# Patient Record
Sex: FEMALE | Race: WHITE | NOT HISPANIC OR LATINO | URBAN - NONMETROPOLITAN AREA
[De-identification: names, ages, dates, MRNs, and addresses within clinical notes are randomized per-mention and may not be internally consistent; named-entity substitution may affect disease eponyms.]

---

## 2019-06-07 ENCOUNTER — IMPORTED ENCOUNTER (OUTPATIENT)
Dept: URBAN - NONMETROPOLITAN AREA CLINIC 1 | Facility: CLINIC | Age: 69
End: 2019-06-07

## 2019-06-07 PROBLEM — E11.3293: Noted: 2019-06-07

## 2019-06-07 PROBLEM — H25.813: Noted: 2019-06-07

## 2019-06-07 PROCEDURE — 92004 COMPRE OPH EXAM NEW PT 1/>: CPT

## 2019-06-07 NOTE — PATIENT DISCUSSION
Cataract OU-Visually significant cataract OU.-Cataract(s) causing symptomatic impairment of visual function not correctable with a tolerable change in glasses or contact lenses lighting or non-operative means resulting in specific activity limitations and/or participation restrictions including but not limited to reading viewing television driving or meeting vocational or recreational needs. -Expectation is clearer vision and functional improvement in symptoms as well as reduced glare disability after cataract removal.-Order IOLMaster and OPD today. -Recommend Standard/Traditional OU based on today's OPD testing and lifestyle questionnaire.-All questions were answered regarding surgery including pre and post-op medications appointments activity restrictions and anesthetic usage.-The risks benefits and alternatives and special risk factors for the patient were discussed in detail including but not limited to: bleeding infection retinal detachment vitreous loss problems with the implant and possible need for additional surgery.-Although rare the possibility of complete vision loss was discussed.-The possible need for glasses post-operatively was discussed.-Order medical clearance exam based on history of hypertension and headaches-Patient elects to proceed with cataract surgery OS. Will schedule at patient's convenience and re-evaluate OD in the future. Mild NPDR OU s Edema OU-Stressed the importance of keeping blood sugars under control blood pressure under control and weight normalization and regular visits with PCP. -Explained the possible effects of poorly controlled diabetes and the damage that diabetes can cause to ocular health. -Patient to check HgbA1C.-Pt instructed to contact our office with any vision changes.

## 2019-06-11 ENCOUNTER — IMPORTED ENCOUNTER (OUTPATIENT)
Dept: URBAN - NONMETROPOLITAN AREA CLINIC 1 | Facility: CLINIC | Age: 69
End: 2019-06-11

## 2019-06-11 PROBLEM — E11.3293: Noted: 2019-06-11

## 2019-06-11 PROBLEM — H25.813: Noted: 2019-06-11

## 2019-06-25 ENCOUNTER — IMPORTED ENCOUNTER (OUTPATIENT)
Dept: URBAN - NONMETROPOLITAN AREA CLINIC 1 | Facility: CLINIC | Age: 69
End: 2019-06-25

## 2019-06-25 PROBLEM — I10: Noted: 2019-06-25

## 2019-06-25 PROBLEM — Z01.818: Noted: 2019-06-25

## 2019-06-25 PROBLEM — E11.3293: Noted: 2019-06-25

## 2019-07-16 ENCOUNTER — IMPORTED ENCOUNTER (OUTPATIENT)
Dept: URBAN - NONMETROPOLITAN AREA CLINIC 1 | Facility: CLINIC | Age: 69
End: 2019-07-16

## 2019-07-16 PROBLEM — E11.3293: Noted: 2019-06-25

## 2019-07-16 PROBLEM — H25.811: Noted: 2019-07-16

## 2019-07-16 PROBLEM — Z98.42: Noted: 2019-07-16

## 2019-07-16 PROCEDURE — 92012 INTRM OPH EXAM EST PATIENT: CPT

## 2019-07-16 PROCEDURE — 99024 POSTOP FOLLOW-UP VISIT: CPT

## 2019-07-16 NOTE — PATIENT DISCUSSION
Cataract OD-Visually significant cataract OD. -Cataract causing symptomatic impairment of visual function not correctable with a tolerable change in glasses or contact lenses lighting or non-operative means resulting in specific activity limitations and/or participation restrictions including but not limited to reading viewing television driving or meeting vocational or recreational needs. -Expectation is clearer vision and functional improvement in symptoms as well as reduced glare disability after cataract removal.-Recommend standard/traditional based on previous OPD testing and lifestyle questionnaire.-All questions were answered regarding surgery including pre and post-op medications appointments activity restrictions and anesthetic usage.-The risks benefits and alternatives and special risk factors for the patient were discussed in detail including but not limited to: bleeding infection retinal detachment vitreous loss problems with the implant and possible need for additional surgery.-Although rare the possibility of complete vision loss was discussed.-The need for glasses post-operatively was discussed.-Patient elects to proceed with cataract surgery OD. Will schedule at patient's convenience. 1 week s/p PCIOL OS-Pt doing well at 1 week s/p PCIOL OS.-Continue post-op gtts according to instruction sheet.-Okay to resume usual activites and d/c eye shield.

## 2022-04-09 ASSESSMENT — VISUAL ACUITY
OD_GLARE: 20/100
OD_GLARE: 20/100
OS_PH: 20/40
OD_PAM: 20/25
OD_PAM: 20/25
OD_PH: 20/40
OS_CC: 20/60
OS_PH: 20/25+1
OS_GLARE: 20/80
OD_CC: 20/50
OS_AM: 20/25
OD_PAM: 20/25
OS_AM: 20/25
OS_GLARE: 20/80
OD_CC: 20/50
OD_PH: 20/40
OD_PH: 20/40
OS_CC: 20/50-2
OS_CC: 20/60
OD_SC: J16
OS_PH: 20/40
OD_CC: 20/50
OD_GLARE: 20/100
OS_SC: J10

## 2022-04-09 ASSESSMENT — TONOMETRY
OD_IOP_MMHG: 14
OS_IOP_MMHG: 16
OD_IOP_MMHG: 12
OS_IOP_MMHG: 14